# Patient Record
Sex: FEMALE | Race: WHITE
[De-identification: names, ages, dates, MRNs, and addresses within clinical notes are randomized per-mention and may not be internally consistent; named-entity substitution may affect disease eponyms.]

---

## 2018-01-31 ENCOUNTER — HOSPITAL ENCOUNTER (EMERGENCY)
Dept: HOSPITAL 80 - FED | Age: 38
Discharge: HOME | End: 2018-01-31
Payer: COMMERCIAL

## 2018-01-31 VITALS — HEART RATE: 79 BPM | RESPIRATION RATE: 18 BRPM | SYSTOLIC BLOOD PRESSURE: 122 MMHG | DIASTOLIC BLOOD PRESSURE: 75 MMHG

## 2018-01-31 VITALS — OXYGEN SATURATION: 99 %

## 2018-01-31 VITALS — TEMPERATURE: 98.1 F

## 2018-01-31 DIAGNOSIS — K80.20: Primary | ICD-10-CM

## 2018-01-31 DIAGNOSIS — E86.9: ICD-10-CM

## 2018-01-31 LAB — PLATELET # BLD: 206 10^3/UL (ref 150–400)

## 2018-01-31 NOTE — CPEKG
Heart Rate: 80

RR Interval: 750

P-R Interval: 136

QRSD Interval: 80

QT Interval: 412

QTC Interval: 476

P Axis: 37

QRS Axis: 61

T Wave Axis: 21

EKG Severity - BORDERLINE ECG -

EKG Impression: SINUS RHYTHM

EKG Impression: VENTRICULAR PREMATURE COMPLEX

EKG Impression: BORDERLINE PROLONGED QT INTERVAL

Electronically Signed By: Charlee Canales 01-Feb-2018 23:05:49

## 2018-01-31 NOTE — EDPHY
HPI/HX/ROS/PE/MDM


Narrative: 





CHIEF COMPLAINT:  Abdominal pain 





HISTORY OF PRESENT ILLNESS: 





This patient is a 37 year old female complaining of midepigastric abdominal 

pain onset suddenly this afternoon.  


She felt well this morning and went to work as usual. She developed 

midepigastric discomfort similar to a heartburn sensation and a coworker 

recommended she try drinking milk. After several sips, her pain escalated from 2

/10 to 10/10 in severity. This felt like a cramp or an intense squeezing 

sensation. She describes this as the worst stomach pain she has ever had and 

felt like she could not breathe due to pain. She endorses increased pain with 

deep inspiration. Her discomfort has waxed and waned. It does not radiate to 

her chest or back. She has had heartburn once in the past, five years ago, but 

not so severe and has never had other similar symptoms. She denies eating any 

unusual foods today. Denies any cardiac history. No family history of 

cholelithiasis. Her last menstrual period ended two days ago, and was normal. 

No fever, chills, chest pain, palpitations, vomiting, diarrhea, urinary 

complaints, headache, lightheadedness. 





REVIEW OF SYSTEMS:


Aside from elements discussed in the HPI, a comprehensive 10-point review of 

systems was reviewed and is negative.





PAST MEDICAL HISTORY: Denies. 





SOCIAL HISTORY: Works as  for [employer]. Social alcohol use. No marijuana use. 

Lives in [place]. 





VITAL SIGNS: Reviewed by me


GENERAL: Well-developed, well-nourished, resting comfortably in no respiratory 

distress.


HEENT: Atraumatic. Eyes: No icterus, no injection. Mouth: moist mucous 

membranes.  No erythema or lesions. Neck: supple with no adenopathy.


LUNGS: Clear to auscultation bilaterally, no wheezes, rhonchi or rales.


CARDIAC: Regular rate and rhythm, no rubs, murmurs or gallops.


ABDOMEN: Soft, nontender, nondistended, bowel sounds normal.


BACK:  No CVA tenderness.


EXTREMITIES: No trauma. No edema.  Range of motion is normal throughout.


NEURO: Alert and oriented,  grossly nonfocal.  


SKIN: Warm and dry, no rash.


PSYCHIATRIC: Normal mentation, no agitation.





Portions of this note were transcribed by a medical scribe.  I personally 

performed a history, physical exam, medical decision making, and confirmed 

accuracy of information the transcribed note. 





ED Course: 





36 y/o female presents with sudden onset abdominal pain beginning shortly prior 

to arrival. Abdomen is benign on exam at this time. Administered GI cocktail 

for symptom relief. 





12-LEAD EKG:  Please see the full report in [facility].  My interpretation: 

Sinus rhythm with PVC. Rate 80. 





The patient feels her pain is considerably relieved following administration of 

GI cocktail. Considering gastritis, reflux, GERD, peptic ulcer vs pancreatitis 

vs cholelithiasis/cholecystitis. Plan for US gallbladder. IV established. Plan 

for labs including CBC, BMP, BHCG, liver, lipase. Plan to administer 1L IV NS. 





Laboratory studies unremarkable. 





14:09 Spoke with Dr. Laurent, radiologist. US shows large gallstone near the 

gallbladder neck with no apparent obstruction: no wall thickening, no fluid. 





14:15 Reassessed patient. Discussed imaging results. Plan to discharge home in 

good condition with referral to general surgery for further evaluation. She 

will begin taking Prilosec daily. Follow up and return precautions discussed. 

She is comfortable with this plan. 


MDM: 


After obtaining the patients history and performing an examination, 

differential diagnosis considered included but was not limited to cholecystitis

, gastritis, pancreatitis, kidney stones, urinary tract infections and other 

causes.








- Data Points


Imaging Results: 


Impression: 1. Cholelithiasis. No secondary signs of acute cholecystitis. 2. No 

biliary dilation or free fluid. Findings discussed with Emergency Department 

physician, Charlee Canales MD on 1/31/2018 at 14:09. Dictated By: Tone Laurent MD 


Imaging: Discussed imaging studies w/ On call Radiologist


Laboratory Results: 


 Laboratory Results





 01/31/18 12:35 





 01/31/18 12:35 








Medications Given: 


 








Discontinued Medications





Al Hydroxide/Mg Hydroxide (Maalox Susp)  30 ml PO EDNOW ONE


   Stop: 01/31/18 12:51


   Last Admin: 01/31/18 12:57 Dose:  30 ml


Al Hydroxide/Mg Hydroxide (Maalox Susp)  30 ml PO EDNOW ONE


   Stop: 01/31/18 13:16


   Last Admin: 01/31/18 13:55 Dose:  Not Given


Hyoscyamine Sulfate (Levsin, Hyomax-Sl)  0.125 mg PO EDNOW ONE


   Stop: 01/31/18 12:49


   Last Admin: 01/31/18 12:57 Dose:  0.125 mg


Hyoscyamine Sulfate (Levsin, Hyomax-Sl)  0.25 mg PO EDNOW ONE


   Stop: 01/31/18 13:16


   Last Admin: 01/31/18 13:04 Dose:  0.125 mg


Sodium Chloride (Ns)  1,000 mls @ 0 mls/hr IV EDNOW ONE; Wide Open


   PRN Reason: Protocol


   Stop: 01/31/18 13:31


   Last Admin: 01/31/18 14:01 Dose:  1,000 mls


Lidocaine (Lidocaine 2% Viscous)  15 ml PO EDNOW ONE


   Stop: 01/31/18 13:16


   Last Admin: 01/31/18 13:05 Dose:  15 ml








General


Time Seen by Provider: 01/31/18 13:16


Initial Vital Signs: 


 Initial Vital Signs











Temperature (C)  36.4 C   01/31/18 12:15


 


Heart Rate  88   01/31/18 12:15


 


Respiratory Rate  20   01/31/18 12:15


 


Blood Pressure  122/90 H  01/31/18 12:15


 


O2 Sat (%)  99   01/31/18 12:15








 











O2 Delivery Mode               Room Air














Allergies/Adverse Reactions: 


 





No Known Allergies Allergy (Unverified 01/31/18 12:15)


 








Home Medications: 














 Medication  Instructions  Recorded


 


NK [No Known Home Meds]  01/31/18














Departure





- Departure


Disposition: Home, Routine, Self-Care


Clinical Impression: 


 Cholelithiasis





Condition: Good


Instructions:  Gallstones (ED)


Additional Instructions: 


1. Follow up with Dr. Galdamez, general surgeon, for further discussion of your 

gallstones. Bring the disc with your images to your consult. 





2. Take Prilosec, available over the counter, daily as directed on the bottle. 





3. Return to the Emergency Department for worsening pain, fever, severe vomiting

, change in character or severity of pain or other worsening of condition.





Referrals: 


Carlito Galdamez MD [Medical Doctor] - As per Instructions


Report Scribed for: Charlee Canales


Report Scribed by: Nayely Rucker


Date of Report: 01/31/18


Time of Report: 13:19